# Patient Record
Sex: FEMALE | HISPANIC OR LATINO | URBAN - METROPOLITAN AREA
[De-identification: names, ages, dates, MRNs, and addresses within clinical notes are randomized per-mention and may not be internally consistent; named-entity substitution may affect disease eponyms.]

---

## 2017-07-06 ENCOUNTER — INPATIENT (INPATIENT)
Facility: HOSPITAL | Age: 27
LOS: 0 days | Discharge: HOME | DRG: 343 | End: 2017-07-06
Attending: STUDENT IN AN ORGANIZED HEALTH CARE EDUCATION/TRAINING PROGRAM | Admitting: STUDENT IN AN ORGANIZED HEALTH CARE EDUCATION/TRAINING PROGRAM
Payer: MEDICAID

## 2017-07-06 ENCOUNTER — TRANSCRIPTION ENCOUNTER (OUTPATIENT)
Age: 27
End: 2017-07-06

## 2017-07-06 ENCOUNTER — RESULT REVIEW (OUTPATIENT)
Age: 27
End: 2017-07-06

## 2017-07-06 DIAGNOSIS — R10.84 GENERALIZED ABDOMINAL PAIN: ICD-10-CM

## 2017-07-06 LAB
BLD GP AB SCN SERPL QL: SIGNIFICANT CHANGE UP
TYPE + AB SCN PNL BLD: SIGNIFICANT CHANGE UP

## 2017-07-06 PROCEDURE — 88304 TISSUE EXAM BY PATHOLOGIST: CPT | Mod: 26

## 2017-07-06 RX ORDER — IBUPROFEN 200 MG
600 TABLET ORAL EVERY 6 HOURS
Qty: 0 | Refills: 0 | Status: DISCONTINUED | OUTPATIENT
Start: 2017-07-06 | End: 2017-07-06

## 2017-07-06 RX ORDER — IBUPROFEN 200 MG
1 TABLET ORAL
Qty: 0 | Refills: 0 | COMMUNITY
Start: 2017-07-06

## 2017-07-06 NOTE — DISCHARGE NOTE ADULT - PROVIDER TOKENS
FREE:[LAST:[ACS clinic],PHONE:[phone]],FAX:[(   [phone]    -],ADDRESS:[[facility] [address]]]

## 2017-07-06 NOTE — DISCHARGE NOTE ADULT - HOSPITAL COURSE
28 yo F with RLQ pain, subjective fever. Workup consistent with acute appendicitis. Taken to the OR for laparoscopic appendectomy. Intraoperative findings of an inflamed, non-perforated appendix. She tolerated the procedure well with no complications. Post op, pain is controlled, tolerating diet, voided and ambulated. Patient was deemed safe for discharge.

## 2017-07-06 NOTE — DISCHARGE NOTE ADULT - CARE PLAN
Principal Discharge DX:	Acute appendicitis with localized peritonitis  Goal:	Laparoscopic appendectomy  Instructions for follow-up, activity and diet:	-Regular diet  -Motrin prn for pain  -OOB/Activity as tolerated  -No heavy lifting (<15 lbs)  -May shower, no baths  -Follow up in the clinic in 2 weeks, call [phone] to make an appointment Principal Discharge DX:	Acute appendicitis with localized peritonitis  Goal:	Laparoscopic appendectomy  Instructions for follow-up, activity and diet:	-Regular diet  -Motrin prn for pain  -OOB/Activity as tolerated  -No heavy lifting (<15 lbs)  -May shower, no baths  -Follow up in the clinic in 2 weeks, call [phone] to make an appointment Principal Discharge DX:	Acute appendicitis with localized peritonitis  Goal:	Laparoscopic appendectomy  Instructions for follow-up, activity and diet:	-Regular diet  -Motrin prn for pain  -OOB/Activity as tolerated  -No heavy lifting (<15 lbs)  -May shower, no baths  -Follow up in the clinic in 2 weeks, call [phone] to make an appointment Principal Discharge DX:	Acute appendicitis with localized peritonitis  Goal:	Laparoscopic appendectomy  Instructions for follow-up, activity and diet:	-Regular diet  -Motrin prn for pain  -OOB/Activity as tolerated  -No heavy lifting (<15 lbs)  -May shower, no baths  -Follow up in the clinic in 2 weeks, call [phone] to make an appointment

## 2017-07-06 NOTE — H&P ADULT - HISTORY OF PRESENT ILLNESS
28 yo F with no PMHx comes to the ED c/o RLQ pain x1 day. Pain started yesterday, RLQ, nonradiating, 10/10, sharp, constant, with no alleviating symptoms. Associated subjective fever. Denies sick contact, recent travel or similar episode in the past. Denies chills, SOB, chest pain, nausea, vomiting, dysuria or any other symptom.

## 2017-07-06 NOTE — DISCHARGE NOTE ADULT - PLAN OF CARE
Laparoscopic appendectomy -Regular diet  -Motrin prn for pain  -OOB/Activity as tolerated  -No heavy lifting (<15 lbs)  -May shower, no baths  -Follow up in the clinic in 2 weeks, call [phone] to make an appointment

## 2017-07-06 NOTE — H&P ADULT - NSHPPHYSICALEXAM_GEN_ALL_CORE
Gen: AAOx3, NAD  Pulm: CTAB  CV: RRR, S1/S2  Abd: S, ND, TTP RLQ, no rebound tenderness  Ext: FROMx4  Vasc: +2 pulses all throughout  Neuro: grossly intact

## 2017-07-06 NOTE — DISCHARGE NOTE ADULT - PATIENT PORTAL LINK FT
“You can access the Follow[facility] Patient Portal, offered by [facility], by registering with the following website: [email or web address]”

## 2017-07-07 DIAGNOSIS — K35.3 ACUTE APPENDICITIS WITH LOCALIZED PERITONITIS: ICD-10-CM

## 2017-07-08 PROCEDURE — 99285 EMERGENCY DEPT VISIT HI MDM: CPT

## 2017-07-25 ENCOUNTER — APPOINTMENT (OUTPATIENT)
Dept: TRAUMA SURGERY | Facility: CLINIC | Age: 27
End: 2017-07-25

## 2017-07-25 VITALS
BODY MASS INDEX: 31.76 KG/M2 | SYSTOLIC BLOOD PRESSURE: 96 MMHG | HEIGHT: 64 IN | TEMPERATURE: 98.6 F | DIASTOLIC BLOOD PRESSURE: 66 MMHG | WEIGHT: 186 LBS | HEART RATE: 68 BPM | OXYGEN SATURATION: 99 %

## 2017-07-25 DIAGNOSIS — Z82.61 FAMILY HISTORY OF ARTHRITIS: ICD-10-CM

## 2017-07-25 DIAGNOSIS — F17.200 NICOTINE DEPENDENCE, UNSPECIFIED, UNCOMPLICATED: ICD-10-CM

## 2017-07-25 DIAGNOSIS — Z82.5 FAMILY HISTORY OF ASTHMA AND OTHER CHRONIC LOWER RESPIRATORY DISEASES: ICD-10-CM

## 2017-07-25 DIAGNOSIS — Z83.3 FAMILY HISTORY OF DIABETES MELLITUS: ICD-10-CM

## 2017-07-25 DIAGNOSIS — Z87.09 PERSONAL HISTORY OF OTHER DISEASES OF THE RESPIRATORY SYSTEM: ICD-10-CM

## 2017-07-25 DIAGNOSIS — Z90.49 ACQUIRED ABSENCE OF OTHER SPECIFIED PARTS OF DIGESTIVE TRACT: ICD-10-CM

## 2017-07-26 PROBLEM — Z82.5 FAMILY HISTORY OF ASTHMA: Status: ACTIVE | Noted: 2017-07-26

## 2017-07-26 PROBLEM — Z83.3 FAMILY HISTORY OF DIABETES MELLITUS: Status: ACTIVE | Noted: 2017-07-26

## 2017-07-26 PROBLEM — Z90.49 STATUS POST LAPAROSCOPIC APPENDECTOMY: Status: ACTIVE | Noted: 2017-07-26

## 2017-07-26 PROBLEM — Z87.09 HISTORY OF ASTHMA: Status: RESOLVED | Noted: 2017-07-26 | Resolved: 2017-07-26

## 2017-07-26 PROBLEM — Z82.61 FAMILY HISTORY OF ARTHRITIS: Status: ACTIVE | Noted: 2017-07-26

## 2017-07-26 PROBLEM — F17.200 CURRENT EVERY DAY SMOKER: Status: ACTIVE | Noted: 2017-07-26

## 2017-12-15 PROCEDURE — 84702 CHORIONIC GONADOTROPIN TEST: CPT

## 2017-12-15 PROCEDURE — 82150 ASSAY OF AMYLASE: CPT

## 2017-12-15 PROCEDURE — 99285 EMERGENCY DEPT VISIT HI MDM: CPT | Mod: 25

## 2017-12-15 PROCEDURE — 80053 COMPREHEN METABOLIC PANEL: CPT

## 2017-12-15 PROCEDURE — 85027 COMPLETE CBC AUTOMATED: CPT

## 2017-12-15 PROCEDURE — 36415 COLL VENOUS BLD VENIPUNCTURE: CPT

## 2017-12-15 PROCEDURE — 74177 CT ABD & PELVIS W/CONTRAST: CPT

## 2017-12-15 PROCEDURE — 85730 THROMBOPLASTIN TIME PARTIAL: CPT

## 2017-12-15 PROCEDURE — 81025 URINE PREGNANCY TEST: CPT

## 2017-12-15 PROCEDURE — 88304 TISSUE EXAM BY PATHOLOGIST: CPT

## 2017-12-15 PROCEDURE — 81001 URINALYSIS AUTO W/SCOPE: CPT

## 2017-12-15 PROCEDURE — 86850 RBC ANTIBODY SCREEN: CPT

## 2017-12-15 PROCEDURE — 96374 THER/PROPH/DIAG INJ IV PUSH: CPT

## 2017-12-15 PROCEDURE — 86900 BLOOD TYPING SEROLOGIC ABO: CPT

## 2017-12-15 PROCEDURE — 86901 BLOOD TYPING SEROLOGIC RH(D): CPT

## 2017-12-15 PROCEDURE — 85610 PROTHROMBIN TIME: CPT

## 2019-08-02 ENCOUNTER — EMERGENCY (EMERGENCY)
Facility: HOSPITAL | Age: 29
LOS: 1 days | Discharge: HOME | End: 2019-08-02
Attending: EMERGENCY MEDICINE
Payer: MEDICAID

## 2019-08-02 VITALS
HEART RATE: 58 BPM | HEIGHT: 63 IN | WEIGHT: 199.96 LBS | SYSTOLIC BLOOD PRESSURE: 114 MMHG | OXYGEN SATURATION: 97 % | RESPIRATION RATE: 20 BRPM | TEMPERATURE: 98 F | DIASTOLIC BLOOD PRESSURE: 59 MMHG

## 2019-08-02 LAB
APPEARANCE UR: CLEAR — SIGNIFICANT CHANGE UP
BILIRUB UR-MCNC: NEGATIVE — SIGNIFICANT CHANGE UP
COLOR SPEC: YELLOW — SIGNIFICANT CHANGE UP
DIFF PNL FLD: NEGATIVE — SIGNIFICANT CHANGE UP
GLUCOSE UR QL: NEGATIVE MG/DL — SIGNIFICANT CHANGE UP
HCG UR QL: NEGATIVE — SIGNIFICANT CHANGE UP
KETONES UR-MCNC: NEGATIVE — SIGNIFICANT CHANGE UP
LEUKOCYTE ESTERASE UR-ACNC: NEGATIVE — SIGNIFICANT CHANGE UP
NITRITE UR-MCNC: NEGATIVE — SIGNIFICANT CHANGE UP
PH UR: 6.5 — SIGNIFICANT CHANGE UP (ref 5–8)
PROT UR-MCNC: NEGATIVE MG/DL — SIGNIFICANT CHANGE UP
SP GR SPEC: 1.01 — SIGNIFICANT CHANGE UP (ref 1.01–1.02)
UROBILINOGEN FLD QL: NEGATIVE MG/DL — SIGNIFICANT CHANGE UP

## 2019-08-02 PROCEDURE — 96375 TX/PRO/DX INJ NEW DRUG ADDON: CPT

## 2019-08-02 PROCEDURE — 81003 URINALYSIS AUTO W/O SCOPE: CPT

## 2019-08-02 PROCEDURE — 81025 URINE PREGNANCY TEST: CPT

## 2019-08-02 PROCEDURE — 99284 EMERGENCY DEPT VISIT MOD MDM: CPT

## 2019-08-02 PROCEDURE — 96374 THER/PROPH/DIAG INJ IV PUSH: CPT

## 2019-08-02 PROCEDURE — 99284 EMERGENCY DEPT VISIT MOD MDM: CPT | Mod: 25

## 2019-08-02 RX ORDER — SODIUM CHLORIDE 9 MG/ML
1000 INJECTION INTRAMUSCULAR; INTRAVENOUS; SUBCUTANEOUS ONCE
Refills: 0 | Status: COMPLETED | OUTPATIENT
Start: 2019-08-02 | End: 2019-08-02

## 2019-08-02 RX ORDER — METOCLOPRAMIDE HCL 10 MG
10 TABLET ORAL ONCE
Refills: 0 | Status: DISCONTINUED | OUTPATIENT
Start: 2019-08-02 | End: 2019-08-02

## 2019-08-02 RX ORDER — METOCLOPRAMIDE HCL 10 MG
10 TABLET ORAL ONCE
Refills: 0 | Status: COMPLETED | OUTPATIENT
Start: 2019-08-02 | End: 2019-08-02

## 2019-08-02 RX ORDER — DIAZEPAM 5 MG
5 TABLET ORAL ONCE
Refills: 0 | Status: DISCONTINUED | OUTPATIENT
Start: 2019-08-02 | End: 2019-08-02

## 2019-08-02 RX ADMIN — Medication 5 MILLIGRAM(S): at 18:56

## 2019-08-02 RX ADMIN — SODIUM CHLORIDE 1000 MILLILITER(S): 9 INJECTION INTRAMUSCULAR; INTRAVENOUS; SUBCUTANEOUS at 19:01

## 2019-08-02 RX ADMIN — Medication 10 MILLIGRAM(S): at 18:56

## 2019-08-02 NOTE — ED STATDOCS - ATTENDING CONTRIBUTION TO CARE
I, Dr. zAul, performed the initial face to face bedside interview with this patient regarding history of present illness, review of symptoms and relevant past medical, social and family history.  I completed an independent physical examination.  I was the initial provider who evaluated this patient. I have signed out the follow up of any pending tests (i.e. labs, radiological studies) to the ACP.  I have communicated the patient’s plan of care and disposition with the ACP.

## 2019-08-02 NOTE — ED STATDOCS - CLINICAL SUMMARY MEDICAL DECISION MAKING FREE TEXT BOX
Pt with headache for a year, worsening over this past week. No fever, no chills, no focal deficits. Headache appears to be migraine-like. Will treat pain and reassess.

## 2019-08-02 NOTE — ED STATDOCS - OBJECTIVE STATEMENT
28 y/o F pt with PMHx of ADHD presents to the ED with mother c/o a worsening headache beginning today. Pt has been having daily self-reported migraines for upwards of a year. Associated symptoms are nausea, light sensitivity, and sound sensitivity. She is also seeing auras/floaters. Pt states that she almost fainted while at work today, and notes that there is a lump on the back of her head, Last medication taken was Naproxen this morning, but she experienced no relief today, and says that this headache is worse than her normal migraines. Pt had an MRI for the same issue last year, and was told that there were no abnormalities. The pain is localized to the left side of her head, and she frequently self-medicates with Advil or other NSAIDs. Pt takes Adderol regularly for ADHD. Denies fever, chills, vomiting, diarrhea, abd pain, neck pain.

## 2019-08-02 NOTE — ED STATDOCS - PROGRESS NOTE DETAILS
PA note: PT evaluated by intake physician. HPI/PE/ROS as noted above. Will follow up plan per intake physician. Pt noting improvement in headache at this time. Pt had negative MRI last year. VSS, pt well appearing, in NAD, non-toxic appearing, neuro intact. Will send pt home with referral for neuro. Pt stable for dc at this time.

## 2019-09-10 ENCOUNTER — APPOINTMENT (OUTPATIENT)
Dept: NEUROLOGY | Facility: CLINIC | Age: 29
End: 2019-09-10

## 2021-09-20 ENCOUNTER — APPOINTMENT (OUTPATIENT)
Dept: OBGYN | Facility: CLINIC | Age: 31
End: 2021-09-20

## 2021-09-20 ENCOUNTER — NON-APPOINTMENT (OUTPATIENT)
Age: 31
End: 2021-09-20

## 2021-09-28 ENCOUNTER — APPOINTMENT (OUTPATIENT)
Dept: OBGYN | Facility: CLINIC | Age: 31
End: 2021-09-28

## 2021-09-28 ENCOUNTER — APPOINTMENT (OUTPATIENT)
Dept: OBGYN | Facility: CLINIC | Age: 31
End: 2021-09-28
Payer: MEDICAID

## 2021-09-28 VITALS
BODY MASS INDEX: 24.07 KG/M2 | DIASTOLIC BLOOD PRESSURE: 70 MMHG | SYSTOLIC BLOOD PRESSURE: 120 MMHG | WEIGHT: 141 LBS | HEIGHT: 64 IN

## 2021-09-28 PROCEDURE — 99203 OFFICE O/P NEW LOW 30 MIN: CPT

## 2021-09-28 PROCEDURE — 81025 URINE PREGNANCY TEST: CPT

## 2021-09-29 LAB
HCG UR QL: NEGATIVE
QUALITY CONTROL: YES

## 2021-09-30 RX ORDER — DEXTROAMPHETAMINE SACCHARATE, AMPHETAMINE ASPARTATE, DEXTROAMPHETAMINE SULFATE, AND AMPHETAMINE SULFATE 5; 5; 5; 5 MG/1; MG/1; MG/1; MG/1
20 TABLET ORAL
Refills: 0 | Status: ACTIVE | COMMUNITY

## 2021-09-30 RX ORDER — CITALOPRAM 20 MG/1
20 TABLET, FILM COATED ORAL
Refills: 0 | Status: ACTIVE | COMMUNITY

## 2021-09-30 NOTE — DISCUSSION/SUMMARY
[FreeTextEntry1] : All forms of contraception including barrier methods, OCPs (combined and progestin-only), patch, vaginal ring, DMPA injection, LARCs, and sterilization were reviewed with the patient. She elects to proceed with nexplanon insertion. \par \par Progestin-only contraceptives including progestin-only pills, DMPA, the subdermal implant, and the hormonal IUDs were discussed with the patient. The patient was counseled about the risks and benefits of POPs, DMPA, implant, and hormonal IUD contraception. Common side-effects of progestin-only contraceptives including changes in bleeding patterns were reviewed. The potential for irregular bleeding associated with the implant was reviewed and all questions answered. \par \par She is aware that hormonal contraceptives, IUDs, and implants do not protect against sexually transmitted diseases and encouraged her to use condoms with her contraception if she is at risk for a STD. \par \par She was told to call with any problematic side-effects to discuss management or changing to another contraceptive before discontinuing. \par \par

## 2021-09-30 NOTE — REVIEW OF SYSTEMS
[Patient Intake Form Reviewed] : Patient intake form was reviewed [Negative] : Heme/Lymph [Fever] : no fever [Chills] : no chills [Dyspnea] : no dyspnea [Chest Pain] : no chest pain [Abdominal Pain] : no abdominal pain [Abn Vaginal bleeding] : no abnormal vaginal bleeding [Pelvic pain] : no pelvic pain

## 2021-09-30 NOTE — HISTORY OF PRESENT ILLNESS
[Patient reported PAP Smear was normal] : Patient reported PAP Smear was normal [N] : Patient reports normal menses [Y] : Positive pregnancy history [Menarche Age: ____] : age at menarche was [unfilled] [Currently Active] : currently active [Men] : men [Vaginal] : vaginal [No] : No [Patient refuses STI testing] : Patient refuses STI testing [PapSmeardate] : 01/01/19 [TextBox_31] : AS PER PATIENT  [LMPDate] : 09/26/21 [PGHxTotal] : 4 [[facility] 2 [[facility] 2 [PGHxABSpont] : 1 [PGxEctopic] : 1 [FreeTextEntry1] : 09/26/21

## 2021-10-15 ENCOUNTER — APPOINTMENT (OUTPATIENT)
Dept: OBGYN | Facility: CLINIC | Age: 31
End: 2021-10-15
Payer: MEDICAID

## 2021-10-15 VITALS
HEIGHT: 64 IN | DIASTOLIC BLOOD PRESSURE: 64 MMHG | SYSTOLIC BLOOD PRESSURE: 106 MMHG | WEIGHT: 136 LBS | BODY MASS INDEX: 23.22 KG/M2

## 2021-10-15 DIAGNOSIS — Z30.09 ENCOUNTER FOR OTHER GENERAL COUNSELING AND ADVICE ON CONTRACEPTION: ICD-10-CM

## 2021-10-15 DIAGNOSIS — Z11.3 ENCOUNTER FOR SCREENING FOR INFECTIONS WITH A PREDOMINANTLY SEXUAL MODE OF TRANSMISSION: ICD-10-CM

## 2021-10-15 PROCEDURE — 11981 INSERTION DRUG DLVR IMPLANT: CPT

## 2021-10-15 PROCEDURE — 99395 PREV VISIT EST AGE 18-39: CPT | Mod: 25

## 2021-10-15 PROCEDURE — 81025 URINE PREGNANCY TEST: CPT

## 2021-10-17 LAB
C TRACH RRNA SPEC QL NAA+PROBE: NOT DETECTED
HAV IGM SER QL: NONREACTIVE
HBV CORE IGM SER QL: NONREACTIVE
HBV SURFACE AG SER QL: NONREACTIVE
HCG UR QL: NEGATIVE
HCV AB SER QL: NONREACTIVE
HCV S/CO RATIO: 0.11 S/CO
HIV1+2 AB SPEC QL IA.RAPID: NONREACTIVE
HPV HIGH+LOW RISK DNA PNL CVX: NOT DETECTED
N GONORRHOEA RRNA SPEC QL NAA+PROBE: NOT DETECTED
QUALITY CONTROL: YES
SOURCE TP AMPLIFICATION: NORMAL
T PALLIDUM AB SER QL IA: NEGATIVE

## 2021-10-17 NOTE — DISCUSSION/SUMMARY
[FreeTextEntry1] :   The benefits of adequate calcium intake and a daily multivitamin along with routine daily cardiovascular exercise were reviewed with the patient.\par  The patient has previously been counseled on all forms of contraception and she desires nexplanon placement. This was performed in conjunction with annual exam and was placed in LUE without issue. \par   The importance of safer-sex was discussed with the patient.\par We reviewed ASCCP/ACOG guidelines for pap smears.

## 2021-10-17 NOTE — COUNSELING
[Vitamins/Supplements] : vitamins/supplements [Nutrition/ Exercise/ Weight Management] : nutrition, exercise, weight management [Breast Self Exam] : breast self exam [Contraception/ Emergency Contraception/ Safe Sexual Practices] : contraception, emergency contraception, safe sexual practices [STD (testing, results, tx)] : STD (testing, results, tx)

## 2021-10-17 NOTE — REVIEW OF SYSTEMS
[Negative] : Heme/Lymph [Patient Intake Form Reviewed] : Patient intake form was reviewed [Fever] : no fever [Chills] : no chills

## 2021-10-17 NOTE — PHYSICAL EXAM
[Appropriately responsive] : appropriately responsive [Alert] : alert [No Acute Distress] : no acute distress [No Lymphadenopathy] : no lymphadenopathy [Soft] : soft [Non-tender] : non-tender [Non-distended] : non-distended [Oriented x3] : oriented x3 [Examination Of The Breasts] : a normal appearance [No Discharge] : no discharge [No Masses] : no breast masses were palpable [Labia Majora] : normal [Labia Minora] : normal [No Bleeding] : There was no active vaginal bleeding [Normal] : normal [Uterine Adnexae] : normal

## 2021-10-17 NOTE — HISTORY OF PRESENT ILLNESS
[N] : Patient reports normal menses [Y] : Positive pregnancy history [Menarche Age: ____] : age at menarche was [unfilled] [Currently Active] : currently active [Men] : men [No] : No [Patient would like to be screened for STIs] : Patient would like to be screened for STIs [PapSmeardate] : 1/1/19 [TextBox_31] : normal per pt [LMPDate] : 9/26/21 [PGHxTotal] : 4 [[facility]] : 2 [PGHxABSpont] : 1 [[facility]] : 2 [PGxEctopic] : 1 [FreeTextEntry1] : 9/26/21

## 2021-10-21 LAB — CYTOLOGY CVX/VAG DOC THIN PREP: NORMAL

## 2022-05-17 ENCOUNTER — APPOINTMENT (OUTPATIENT)
Dept: OBGYN | Facility: AMBULATORY SURGERY CENTER | Age: 32
End: 2022-05-17
Payer: MEDICAID

## 2022-05-17 VITALS
HEIGHT: 64 IN | WEIGHT: 128 LBS | DIASTOLIC BLOOD PRESSURE: 70 MMHG | SYSTOLIC BLOOD PRESSURE: 110 MMHG | BODY MASS INDEX: 21.85 KG/M2

## 2022-05-17 DIAGNOSIS — N92.1 EXCESSIVE AND FREQUENT MENSTRUATION WITH IRREGULAR CYCLE: ICD-10-CM

## 2022-05-17 DIAGNOSIS — Z97.5 EXCESSIVE AND FREQUENT MENSTRUATION WITH IRREGULAR CYCLE: ICD-10-CM

## 2022-05-17 DIAGNOSIS — Z30.015 ENCOUNTER FOR INITIAL PRESCRIPTION OF VAGINAL RING HORMONAL CONTRACEPTIVE: ICD-10-CM

## 2022-05-17 DIAGNOSIS — Z32.02 ENCOUNTER FOR PREGNANCY TEST, RESULT NEGATIVE: ICD-10-CM

## 2022-05-17 LAB
HCG UR QL: NEGATIVE
QUALITY CONTROL: YES

## 2022-05-17 PROCEDURE — 11982 REMOVE DRUG IMPLANT DEVICE: CPT

## 2022-05-17 PROCEDURE — 81025 URINE PREGNANCY TEST: CPT

## 2022-05-17 PROCEDURE — 99213 OFFICE O/P EST LOW 20 MIN: CPT | Mod: 25

## 2022-05-17 NOTE — PHYSICAL EXAM
[Chaperone Present] : A chaperone was present in the examining room during all aspects of the physical examination [FreeTextEntry1] : sharath [Appropriately responsive] : appropriately responsive [Alert] : alert [No Acute Distress] : no acute distress [Oriented x3] : oriented x3

## 2022-05-17 NOTE — DISCUSSION/SUMMARY
[FreeTextEntry1] : Nexplanon removed without issue. She is aware of immediate return to fertility. \par \par Rx for nuvaring issued.\par \par Patient was counseled on all contraceptive methods (barrier, OCPs both combined and progestin-only, patch, vaginal ring, DMPA injection, LARCs, sterilization) and elects to use the ring\par \par Common side-effects of CHC were reviewed. Typical effectiveness rates of 91% were reviewed. The patient was instructed in the correct use of the ring and what to do in the event of missing ring. The patient was also counseled about the reduced contraceptive effectiveness if not used properly. The patient was counseled on the risk of blood clot and stroke, but that the risk of stroke from pregnancy outweighs that from CHC. The patient denies history of blood clot/HTN/CVA/migraine with aura/breast cancer/liver disease/gallbladder disease/FH of first degree relative with VTE/CVA. Reviewed option of continuous CHC and that breakthrough bleeding may occur with a continuous regimen. \par \par She is aware that the nuvaring does not protect against STDs and encouraged her to use condoms with her contraception if she is at risk for an STD. \par \par She was also told to call with any problematic side-effects to discuss management or changing to another contraceptive method before discontinuing. \par

## 2022-05-17 NOTE — REASON FOR VISIT
[Follow-Up] : a follow-up evaluation of [Abnormal Uterine Bleeding] : abnormal uterine bleeding [FreeTextEntry2] : nexplanon removal

## 2022-05-17 NOTE — HISTORY OF PRESENT ILLNESS
[FreeTextEntry1] : Vilma presents for prolonged bleeding on nexplanon and desires removal.\par She used to be on the nuvaring and wishes to restart the nuvaring.

## 2022-05-18 ENCOUNTER — NON-APPOINTMENT (OUTPATIENT)
Age: 32
End: 2022-05-18

## 2022-06-17 ENCOUNTER — NON-APPOINTMENT (OUTPATIENT)
Age: 32
End: 2022-06-17

## 2022-07-06 ENCOUNTER — APPOINTMENT (OUTPATIENT)
Dept: ORTHOPEDIC SURGERY | Facility: CLINIC | Age: 32
End: 2022-07-06

## 2022-07-06 VITALS
DIASTOLIC BLOOD PRESSURE: 67 MMHG | HEIGHT: 64 IN | SYSTOLIC BLOOD PRESSURE: 107 MMHG | BODY MASS INDEX: 21.85 KG/M2 | HEART RATE: 57 BPM | WEIGHT: 128 LBS

## 2022-07-06 DIAGNOSIS — M77.8 OTHER ENTHESOPATHIES, NOT ELSEWHERE CLASSIFIED: ICD-10-CM

## 2022-07-06 PROCEDURE — 99204 OFFICE O/P NEW MOD 45 MIN: CPT

## 2022-07-06 PROCEDURE — 73110 X-RAY EXAM OF WRIST: CPT | Mod: RT

## 2022-07-06 RX ORDER — MELOXICAM 15 MG/1
15 TABLET ORAL DAILY
Qty: 14 | Refills: 0 | Status: ACTIVE | COMMUNITY
Start: 2022-07-06 | End: 1900-01-01

## 2022-07-06 RX ORDER — DICLOFENAC SODIUM 1% 10 MG/G
1 GEL TOPICAL
Qty: 1 | Refills: 2 | Status: ACTIVE | COMMUNITY
Start: 2022-07-06 | End: 1900-01-01

## 2022-11-22 ENCOUNTER — APPOINTMENT (OUTPATIENT)
Dept: OBGYN | Facility: CLINIC | Age: 32
End: 2022-11-22

## 2022-11-22 VITALS — DIASTOLIC BLOOD PRESSURE: 64 MMHG | SYSTOLIC BLOOD PRESSURE: 110 MMHG | HEIGHT: 64 IN

## 2022-11-22 DIAGNOSIS — Z30.46 ENCOUNTER FOR SURVEILLANCE OF IMPLANTABLE SUBDERMAL CONTRACEPTIVE: ICD-10-CM

## 2022-11-22 DIAGNOSIS — Z30.017 ENCOUNTER FOR INITIAL PRESCRIPTION OF IMPLANTABLE SUBDERMAL CONTRACEPTIVE: ICD-10-CM

## 2022-11-22 DIAGNOSIS — Z30.44 ENCOUNTER FOR SURVEILLANCE OF VAGINAL RING HORMONAL CONTRACEPTIVE DEVICE: ICD-10-CM

## 2022-11-22 DIAGNOSIS — Z01.419 ENCOUNTER FOR GYNECOLOGICAL EXAMINATION (GENERAL) (ROUTINE) W/OUT ABNORMAL FINDINGS: ICD-10-CM

## 2022-11-22 PROCEDURE — 90686 IIV4 VACC NO PRSV 0.5 ML IM: CPT

## 2022-11-22 PROCEDURE — 90471 IMMUNIZATION ADMIN: CPT

## 2022-11-22 PROCEDURE — 99395 PREV VISIT EST AGE 18-39: CPT | Mod: 25

## 2022-11-22 PROCEDURE — 90472 IMMUNIZATION ADMIN EACH ADD: CPT

## 2022-11-22 PROCEDURE — 90651 9VHPV VACCINE 2/3 DOSE IM: CPT

## 2022-11-22 RX ORDER — METHYLPREDNISOLONE 4 MG/1
4 TABLET ORAL
Qty: 1 | Refills: 0 | Status: COMPLETED | COMMUNITY
Start: 2022-07-06 | End: 2022-11-22

## 2022-11-22 RX ORDER — DEXTROAMPHETAMINE SACCHARATE, AMPHETAMINE ASPARTATE MONOHYDRATE, DEXTROAMPHETAMINE SULFATE AND AMPHETAMINE SULFATE 7.5; 7.5; 7.5; 7.5 MG/1; MG/1; MG/1; MG/1
30 CAPSULE, EXTENDED RELEASE ORAL
Qty: 30 | Refills: 0 | Status: COMPLETED | COMMUNITY
Start: 2022-10-13

## 2022-11-22 RX ORDER — ALPRAZOLAM 0.5 MG/1
0.5 TABLET ORAL
Qty: 90 | Refills: 0 | Status: ACTIVE | COMMUNITY
Start: 2022-10-13

## 2022-11-22 RX ORDER — FLUTICASONE PROPIONATE 50 UG/1
50 SPRAY, METERED NASAL
Qty: 10 | Refills: 0 | Status: ACTIVE | COMMUNITY
Start: 2022-09-16

## 2022-11-22 NOTE — DISCUSSION/SUMMARY
[FreeTextEntry1] :   The benefits of adequate calcium intake and a daily multivitamin along with routine daily cardiovascular exercise were reviewed with the patient.\par   Nuvaring renewed with correct instructions reviewed. \par   The importance of safer-sex was discussed with the patient.\par We reviewed ASCCP/ACOG guidelines for pap smears. Does not need today based on guidelines. \par Flu and gardasil vaccine given. RTO 8 weeks for next dose of gardasil.

## 2022-11-22 NOTE — COUNSELING
[Nutrition/ Exercise/ Weight Management] : nutrition, exercise, weight management [Vitamins/Supplements] : vitamins/supplements [Breast Self Exam] : breast self exam [Contraception/ Emergency Contraception/ Safe Sexual Practices] : contraception, emergency contraception, safe sexual practices [STD (testing, results, tx)] : STD (testing, results, tx) [Influenza Vaccine] : influenza vaccine [HPV Vaccine] : HPV Vaccine

## 2022-11-22 NOTE — HISTORY OF PRESENT ILLNESS
[No] : Patient does not have concerns regarding sex [Currently Active] : currently active [Patient refuses STI testing] : Patient refuses STI testing [FreeTextEntry1] : Vilma is a  LMP 10/31/22 who presents for annual exam. She is without complaints. Denies pelvic pain, abnormal bleeding, or vaginal discharge. Denies issues with bowel or bladder function. \par She is sexually active with 1 male partner (s). She is using nuvaring for contraception and is happy with this method of contraception. Denies pain with intercourse. She is sexually satisfied. \par Lives with family. Works as . Feels safe at home. Denies depression/abuse. \par Immunizations: Did not receive gardasil but would like it. Also requesting flu shot. \par

## 2022-11-22 NOTE — PHYSICAL EXAM
[Chaperone Present] : A chaperone was present in the examining room during all aspects of the physical examination [FreeTextEntry1] : sharath [Appropriately responsive] : appropriately responsive [Alert] : alert [No Acute Distress] : no acute distress [No Lymphadenopathy] : no lymphadenopathy [Soft] : soft [Non-tender] : non-tender [Non-distended] : non-distended [No Lesions] : no lesions [Oriented x3] : oriented x3 [Examination Of The Breasts] : a normal appearance [Breast Palpation Diffuse Fibrous Tissue Bilateral] : fibrocystic changes [No Discharge] : no discharge [No Masses] : no breast masses were palpable [Labia Majora] : normal [Labia Minora] : normal [No Bleeding] : There was no active vaginal bleeding [Normal] : normal [Uterine Adnexae] : non-palpable

## 2023-01-19 ENCOUNTER — APPOINTMENT (OUTPATIENT)
Dept: OBGYN | Facility: CLINIC | Age: 33
End: 2023-01-19

## 2023-01-21 ENCOUNTER — APPOINTMENT (OUTPATIENT)
Dept: OBGYN | Facility: CLINIC | Age: 33
End: 2023-01-21
Payer: MEDICAID

## 2023-01-21 VITALS
WEIGHT: 128 LBS | BODY MASS INDEX: 21.85 KG/M2 | SYSTOLIC BLOOD PRESSURE: 128 MMHG | HEIGHT: 64 IN | DIASTOLIC BLOOD PRESSURE: 82 MMHG

## 2023-01-21 DIAGNOSIS — Z00.00 ENCOUNTER FOR GENERAL ADULT MEDICAL EXAMINATION W/OUT ABNORMAL FINDINGS: ICD-10-CM

## 2023-01-21 DIAGNOSIS — Z23 ENCOUNTER FOR IMMUNIZATION: ICD-10-CM

## 2023-01-21 PROCEDURE — 90651 9VHPV VACCINE 2/3 DOSE IM: CPT

## 2023-01-21 PROCEDURE — 90471 IMMUNIZATION ADMIN: CPT

## 2023-01-21 NOTE — COUNSELING
[Contraception/ Emergency Contraception/ Safe Sexual Practices] : contraception, emergency contraception, safe sexual practices [Vaccines] : vaccines [HPV Vaccine] : HPV Vaccine

## 2023-01-21 NOTE — HISTORY OF PRESENT ILLNESS
[N] : Patient reports normal menses [Intravaginal Ring] : uses the intravaginal ring [Y] : Positive pregnancy history [Menarche Age: ____] : age at menarche was [unfilled] [Currently Active] : currently active [PapSmeardate] : 10/15/21 [TextBox_31] : NEG [GonorrheaDate] : 10/15/21 [TextBox_63] : NEG [ChlamydiaDate] : 10/15/21 [TextBox_68] : NEG [HPVDate] : 10/15/21 [TextBox_78] : NEG [PGHxTotal] : 4 [LMPDate] : 1/10/23 [[facility]] : 2 [[facility]] : 2 [PGHxABInduced] : 1 [PGxEctopic] : 1 [FreeTextEntry1] : 1/10/23

## 2023-01-21 NOTE — DISCUSSION/SUMMARY
[FreeTextEntry1] : Reviewed R/B/C of Gardasil vaccine.  Pt to RTO for 3rd dose in July.  All the pt's questions and concerns were addressed.

## 2023-07-17 RX ORDER — ETONOGESTREL AND ETHINYL ESTRADIOL 11.7; 2.7 MG/1; MG/1
0.12-0.015 INSERT, EXTENDED RELEASE VAGINAL
Qty: 1 | Refills: 3 | Status: ACTIVE | COMMUNITY
Start: 2022-05-17 | End: 1900-01-01

## 2023-07-26 ENCOUNTER — APPOINTMENT (OUTPATIENT)
Dept: OBGYN | Facility: CLINIC | Age: 33
End: 2023-07-26

## 2024-05-08 ENCOUNTER — APPOINTMENT (OUTPATIENT)
Dept: OBGYN | Facility: CLINIC | Age: 34
End: 2024-05-08

## 2024-07-25 ENCOUNTER — RX RENEWAL (OUTPATIENT)
Age: 34
End: 2024-07-25

## 2024-10-22 ENCOUNTER — RX RENEWAL (OUTPATIENT)
Age: 34
End: 2024-10-22